# Patient Record
(demographics unavailable — no encounter records)

---

## 2025-01-07 NOTE — DEVELOPMENTAL MILESTONES
[Normal Development] : Normal Development [None] : none [Is dry day and night] : is dry day and night [Chooses preferred foods] : chooses preferred foods [Starts/continues conversation with peers] : starts/continues conversation with peers [Plays and interacts with at least one] : plays and interacts with at least one "best friend" [Tells a story with a beginning,] : tells a story with a beginning, a middle, and an end [Masters all consonant sounds and] : masters all consonant sounds and combinations, such as "d" or "ch" [Counts 10 objects] : counts 10 objects [Can do simple addition and] : can do simple addition and subtraction with objects [Hops on one foot 3 to 4 times] : hops on one foot 3 to 4 times [Catches small ball with] : catches small ball with 2 hands [Draw a 12-part person] : draw a 12-part person [Prints 3 or more simple words] : prints 3 or more simple words without copying [Writes first and last name in] : writes first and last name in uppercase or lowercase letters [Cuts most foods with a knife] : cuts most foods with a knife [Rides a standard bike] : does not ride a standard bike

## 2025-01-07 NOTE — HISTORY OF PRESENT ILLNESS
[Mother] : mother [Fruit] : fruit [Vegetables] : vegetables [Meat] : meat [Grains] : grains [Eggs] : eggs [Dairy] : dairy [Grade ___] : Grade [unfilled] [Normal] : Normal [Brushing teeth] : Brushing teeth [Yes] : Patient goes to dentist yearly [Vitamin] : Primary Fluoride Source: Vitamin [Playtime (60 min/d)] : Playtime 60 min a day [Water heater temperature set at <120 degrees F] : Water heater temperature set at <120 degrees F [Car seat in back seat] : Car seat in back seat [Carbon Monoxide Detectors] : Carbon monoxide detectors [Smoke Detectors] : Smoke detectors [Supervised outdoor play] : Supervised outdoor play [Up to date] : Up to date [Exposure to electronic nicotine delivery system] : No exposure to electronic nicotine delivery system [FreeTextEntry7] : WCC 6 years  [de-identified] : none  [de-identified] : Recently diagnosed with ADHD- 504 plan with improvement  [de-identified] : Due for Flu

## 2025-01-07 NOTE — DISCUSSION/SUMMARY
[Full Activity without restrictions including Physical Education & Athletics] : Full Activity without restrictions including Physical Education & Athletics [] : The components of the vaccine(s) to be administered today are listed in the plan of care. The disease(s) for which the vaccine(s) are intended to prevent and the risks have been discussed with the caretaker.  The risks are also included in the appropriate vaccination information statements which have been provided to the patient's caregiver.  The caregiver has given consent to vaccinate. [FreeTextEntry1] : 6 year old male presents for [facility]. Appears clinically well. No growth, developmental, elimination, feeding, skin and sleep concerns.  Continue balanced diet with all food groups. Brush teeth twice a day with toothbrush. Recommend visit to dentist. Help child to maintain consistent daily routines and sleep schedule. School discussed. Ensure home is safe. Teach child about personal safety. Use consistent, positive discipline. Limit screen time to no more than 2 hours per day. Encourage physical activity. Child needs to ride in a belt-positioning booster seat until  4 feet 9 inches has been reached and are between 8 and 12 years of age.      Due for Flu today - risks/benefits/side effects reviewed - VIS given - Mom agrees without questions.   Return 1 year for routine well child check.

## 2025-01-07 NOTE — PHYSICAL EXAM
[Alert] : alert [No Acute Distress] : no acute distress [Normocephalic] : normocephalic [Conjunctivae with no discharge] : conjunctivae with no discharge [PERRL] : PERRL [EOMI Bilateral] : EOMI bilateral [Auricles Well Formed] : auricles well formed [Clear Tympanic membranes with present light reflex and bony landmarks] : clear tympanic membranes with present light reflex and bony landmarks [No Discharge] : no discharge [Nares Patent] : nares patent [Pink Nasal Mucosa] : pink nasal mucosa [Palate Intact] : palate intact [Nonerythematous Oropharynx] : nonerythematous oropharynx [Supple, full passive range of motion] : supple, full passive range of motion [No Palpable Masses] : no palpable masses [Symmetric Chest Rise] : symmetric chest rise [Clear to Auscultation Bilaterally] : clear to auscultation bilaterally [Regular Rate and Rhythm] : regular rate and rhythm [Normal S1, S2 present] : normal S1, S2 present [No Murmurs] : no murmurs [+2 Femoral Pulses] : +2 femoral pulses [Soft] : soft [NonTender] : non tender [Non Distended] : non distended [Normoactive Bowel Sounds] : normoactive bowel sounds [No Hepatomegaly] : no hepatomegaly [No Splenomegaly] : no splenomegaly [Testicles Descended Bilaterally] : testicles descended bilaterally [Patent] : patent [No fissures] : no fissures [No Abnormal Lymph Nodes Palpated] : no abnormal lymph nodes palpated [No Gait Asymmetry] : no gait asymmetry [No pain or deformities with palpation of bone, muscles, joints] : no pain or deformities with palpation of bone, muscles, joints [Normal Muscle Tone] : normal muscle tone [Straight] : straight [+2 Patella DTR] : +2 patella DTR [Cranial Nerves Grossly Intact] : cranial nerves grossly intact [No Rash or Lesions] : no rash or lesions [FreeTextEntry5] : +wears glasses [FreeTextEntry3] : Hearing PASS